# Patient Record
Sex: MALE | Race: OTHER | HISPANIC OR LATINO | URBAN - METROPOLITAN AREA
[De-identification: names, ages, dates, MRNs, and addresses within clinical notes are randomized per-mention and may not be internally consistent; named-entity substitution may affect disease eponyms.]

---

## 2017-06-12 ENCOUNTER — EMERGENCY (EMERGENCY)
Facility: HOSPITAL | Age: 27
LOS: 1 days | Discharge: HOME | End: 2017-06-12
Attending: EMERGENCY MEDICINE
Payer: MEDICAID

## 2017-06-12 VITALS
SYSTOLIC BLOOD PRESSURE: 136 MMHG | HEART RATE: 79 BPM | RESPIRATION RATE: 16 BRPM | DIASTOLIC BLOOD PRESSURE: 76 MMHG | OXYGEN SATURATION: 99 % | TEMPERATURE: 98 F | HEIGHT: 66 IN | WEIGHT: 220.02 LBS

## 2017-06-12 VITALS
HEART RATE: 74 BPM | OXYGEN SATURATION: 98 % | RESPIRATION RATE: 16 BRPM | SYSTOLIC BLOOD PRESSURE: 126 MMHG | DIASTOLIC BLOOD PRESSURE: 70 MMHG | TEMPERATURE: 98 F

## 2017-06-12 DIAGNOSIS — H00.011 HORDEOLUM EXTERNUM RIGHT UPPER EYELID: ICD-10-CM

## 2017-06-12 PROCEDURE — 99283 EMERGENCY DEPT VISIT LOW MDM: CPT

## 2017-06-12 RX ADMIN — Medication 1 TABLET(S): at 15:09

## 2017-06-12 NOTE — ED PROVIDER NOTE - OBJECTIVE STATEMENT
27 year old male came to the ED because of right eye swelling. 2 days ago he noted a swollen "ball" over the right eyelid, that has worsened. No fever, no chills, no changes in vision, no neck pain, no headache.

## 2017-06-12 NOTE — ED PROVIDER NOTE - MEDICAL DECISION MAKING DETAILS
Will dc to follow up with optho. Precautions reviewed. Will dc to follow up with optho. Precautions reviewed. Warm soaks and antibiotics.

## 2017-06-12 NOTE — ED PROVIDER NOTE - EYES, MLM
Clear bilaterally, pupils equal, round and reactive to light. Mild erythema and swelling over the right upper eyelid.
